# Patient Record
Sex: MALE | Race: WHITE | NOT HISPANIC OR LATINO | Employment: FULL TIME | URBAN - METROPOLITAN AREA
[De-identification: names, ages, dates, MRNs, and addresses within clinical notes are randomized per-mention and may not be internally consistent; named-entity substitution may affect disease eponyms.]

---

## 2024-03-14 ENCOUNTER — HOSPITAL ENCOUNTER (EMERGENCY)
Facility: HOSPITAL | Age: 59
Discharge: HOME | End: 2024-03-14
Attending: EMERGENCY MEDICINE | Admitting: EMERGENCY MEDICINE
Payer: COMMERCIAL

## 2024-03-14 ENCOUNTER — APPOINTMENT (OUTPATIENT)
Dept: GENERAL RADIOLOGY | Facility: HOSPITAL | Age: 59
End: 2024-03-14
Payer: COMMERCIAL

## 2024-03-14 ENCOUNTER — APPOINTMENT (OUTPATIENT)
Dept: MRI IMAGING | Facility: HOSPITAL | Age: 59
End: 2024-03-14
Payer: COMMERCIAL

## 2024-03-14 ENCOUNTER — APPOINTMENT (OUTPATIENT)
Dept: CT IMAGING | Facility: HOSPITAL | Age: 59
End: 2024-03-14
Payer: COMMERCIAL

## 2024-03-14 VITALS
RESPIRATION RATE: 21 BRPM | HEART RATE: 62 BPM | SYSTOLIC BLOOD PRESSURE: 138 MMHG | BODY MASS INDEX: 23.77 KG/M2 | DIASTOLIC BLOOD PRESSURE: 95 MMHG | TEMPERATURE: 99.1 F | OXYGEN SATURATION: 96 % | WEIGHT: 169.75 LBS | HEIGHT: 71 IN

## 2024-03-14 DIAGNOSIS — R20.0 FACIAL NUMBNESS: Primary | ICD-10-CM

## 2024-03-14 DIAGNOSIS — I10 HYPERTENSION NOT AT GOAL: ICD-10-CM

## 2024-03-14 LAB
ALBUMIN SERPL-MCNC: 4.2 G/DL (ref 3.5–5.2)
ALBUMIN/GLOB SERPL: 1.6 G/DL
ALP SERPL-CCNC: 59 U/L (ref 39–117)
ALT SERPL W P-5'-P-CCNC: 10 U/L (ref 1–41)
ANION GAP SERPL CALCULATED.3IONS-SCNC: 12.2 MMOL/L (ref 5–15)
AST SERPL-CCNC: 18 U/L (ref 1–40)
BASOPHILS # BLD AUTO: 0.04 10*3/MM3 (ref 0–0.2)
BASOPHILS NFR BLD AUTO: 0.6 % (ref 0–1.5)
BILIRUB SERPL-MCNC: 0.8 MG/DL (ref 0–1.2)
BUN SERPL-MCNC: 14 MG/DL (ref 8–23)
BUN/CREAT SERPL: 14 (ref 7–25)
CALCIUM SPEC-SCNC: 9.1 MG/DL (ref 8.6–10.5)
CHLORIDE SERPL-SCNC: 104 MMOL/L (ref 98–107)
CO2 SERPL-SCNC: 23.8 MMOL/L (ref 22–29)
CREAT SERPL-MCNC: 1 MG/DL (ref 0.76–1.27)
DEPRECATED RDW RBC AUTO: 42.5 FL (ref 37–54)
EGFRCR SERPLBLD CKD-EPI 2021: 86.2 ML/MIN/1.73
EOSINOPHIL # BLD AUTO: 0.09 10*3/MM3 (ref 0–0.4)
EOSINOPHIL NFR BLD AUTO: 1.3 % (ref 0.3–6.2)
ERYTHROCYTE [DISTWIDTH] IN BLOOD BY AUTOMATED COUNT: 13.2 % (ref 12.3–15.4)
GLOBULIN UR ELPH-MCNC: 2.6 GM/DL
GLUCOSE SERPL-MCNC: 86 MG/DL (ref 65–99)
HCT VFR BLD AUTO: 42.4 % (ref 37.5–51)
HGB BLD-MCNC: 14.3 G/DL (ref 13–17.7)
IMM GRANULOCYTES # BLD AUTO: 0.01 10*3/MM3 (ref 0–0.05)
IMM GRANULOCYTES NFR BLD AUTO: 0.1 % (ref 0–0.5)
LYMPHOCYTES # BLD AUTO: 1.19 10*3/MM3 (ref 0.7–3.1)
LYMPHOCYTES NFR BLD AUTO: 17.4 % (ref 19.6–45.3)
MCH RBC QN AUTO: 29.7 PG (ref 26.6–33)
MCHC RBC AUTO-ENTMCNC: 33.7 G/DL (ref 31.5–35.7)
MCV RBC AUTO: 88 FL (ref 79–97)
MONOCYTES # BLD AUTO: 0.65 10*3/MM3 (ref 0.1–0.9)
MONOCYTES NFR BLD AUTO: 9.5 % (ref 5–12)
NEUTROPHILS NFR BLD AUTO: 4.84 10*3/MM3 (ref 1.7–7)
NEUTROPHILS NFR BLD AUTO: 71.1 % (ref 42.7–76)
NRBC BLD AUTO-RTO: 0 /100 WBC (ref 0–0.2)
NT-PROBNP SERPL-MCNC: 65.1 PG/ML (ref 0–900)
PLATELET # BLD AUTO: 297 10*3/MM3 (ref 140–450)
PMV BLD AUTO: 9.4 FL (ref 6–12)
POTASSIUM SERPL-SCNC: 4 MMOL/L (ref 3.5–5.2)
PROT SERPL-MCNC: 6.8 G/DL (ref 6–8.5)
RBC # BLD AUTO: 4.82 10*6/MM3 (ref 4.14–5.8)
SODIUM SERPL-SCNC: 140 MMOL/L (ref 136–145)
TROPONIN T SERPL HS-MCNC: <6 NG/L
WBC NRBC COR # BLD AUTO: 6.82 10*3/MM3 (ref 3.4–10.8)

## 2024-03-14 PROCEDURE — 99284 EMERGENCY DEPT VISIT MOD MDM: CPT

## 2024-03-14 PROCEDURE — 84484 ASSAY OF TROPONIN QUANT: CPT | Performed by: EMERGENCY MEDICINE

## 2024-03-14 PROCEDURE — 85025 COMPLETE CBC W/AUTO DIFF WBC: CPT | Performed by: EMERGENCY MEDICINE

## 2024-03-14 PROCEDURE — 70551 MRI BRAIN STEM W/O DYE: CPT

## 2024-03-14 PROCEDURE — 83880 ASSAY OF NATRIURETIC PEPTIDE: CPT | Performed by: EMERGENCY MEDICINE

## 2024-03-14 PROCEDURE — 70450 CT HEAD/BRAIN W/O DYE: CPT

## 2024-03-14 PROCEDURE — 93005 ELECTROCARDIOGRAM TRACING: CPT | Performed by: EMERGENCY MEDICINE

## 2024-03-14 PROCEDURE — 71045 X-RAY EXAM CHEST 1 VIEW: CPT

## 2024-03-14 PROCEDURE — 80053 COMPREHEN METABOLIC PANEL: CPT | Performed by: EMERGENCY MEDICINE

## 2024-03-14 RX ORDER — LISINOPRIL 10 MG/1
10 TABLET ORAL DAILY
Qty: 30 TABLET | Refills: 0 | Status: SHIPPED | OUTPATIENT
Start: 2024-03-14

## 2024-03-14 NOTE — ED PROVIDER NOTES
"Time: 1:38 PM EDT  Date of encounter:  3/14/2024  Independent Historian/Clinical History and Information was obtained by:   Patient    History is limited by: N/A    Chief Complaint: Facial numbness      History of Present Illness:  Patient is a 59 y.o. year old male who presents to the emergency department for evaluation of left-sided facial numbness.  Reports this started this morning around 11:00.  States that he had a similar episode last weekend and then it slowly resolved.  He was seen at urgent care at that time.  He also reports he has been having some intermittent right hand numbness.  He does states that he has had issues with his neck and has had issues with his hand being numb occasionally.  States that this morning his facial numbness got more intense and he also got a little nauseous with it and came to the hospital.  Does have a history of hypertension but does not take anything.  No other complaints this time.    HPI    Patient Care Team  Primary Care Provider: Provider, No Known    Past Medical History:     No Known Allergies  History reviewed. No pertinent past medical history.  History reviewed. No pertinent surgical history.  History reviewed. No pertinent family history.    Home Medications:  Prior to Admission medications    Not on File        Social History:          Review of Systems:  Review of Systems     Physical Exam:  /95 (BP Location: Right arm, Patient Position: Sitting)   Pulse 72   Temp 99.1 °F (37.3 °C) (Oral)   Resp 21   Ht 180.3 cm (71\")   Wt 77 kg (169 lb 12.1 oz)   SpO2 97%   BMI 23.68 kg/m²     Physical Exam             Procedures:  Procedures      Medical Decision Making:      Comorbidities that affect care:    Hypertension    External Notes reviewed:    Reviewed urgent care note from 3/9/2024      The following orders were placed and all results were independently analyzed by me:  Orders Placed This Encounter   Procedures    CT Head Without Contrast    XR Chest 1 " View    MRI Brain Without Contrast    Comprehensive Metabolic Panel    Single High Sensitivity Troponin T    BNP    CBC Auto Differential    ECG 12 Lead Electrolyte Imbalance    CBC & Differential       Medications Given in the Emergency Department:  Medications - No data to display     ED Course:    ED Course as of 03/14/24 1640   Thu Mar 14, 2024   1611 EKG interpreted by me  Time: 1533  Heart rate 60  Sinus, normal QRS, normal axis, no acute ischemia [MA]      ED Course User Index  [MA] Lalo Francisco MD       Labs:    Lab Results (last 24 hours)       Procedure Component Value Units Date/Time    CBC & Differential [[id number]]  (Abnormal) Collected: 03/14/24 1429    Specimen: Blood Updated: 03/14/24 1439    Narrative:      The following orders were created for panel order CBC & Differential.  Procedure                               Abnormality         Status                     ---------                               -----------         ------                     CBC Auto Differential[[id number]]        Abnormal            Final result                 Please view results for these tests on the individual orders.    Comprehensive Metabolic Panel [[id number]] Collected: 03/14/24 1429    Specimen: Blood Updated: 03/14/24 1507     Glucose 86 mg/dL      BUN 14 mg/dL      Creatinine 1.00 mg/dL      Sodium 140 mmol/L      Potassium 4.0 mmol/L      Chloride 104 mmol/L      CO2 23.8 mmol/L      Calcium 9.1 mg/dL      Total Protein 6.8 g/dL      Albumin 4.2 g/dL      ALT (SGPT) 10 U/L      AST (SGOT) 18 U/L      Alkaline Phosphatase 59 U/L      Total Bilirubin 0.8 mg/dL      Globulin 2.6 gm/dL      A/G Ratio 1.6 g/dL      BUN/Creatinine Ratio 14.0     Anion Gap 12.2 mmol/L      eGFR 86.2 mL/min/1.73     Narrative:      GFR Normal >60  Chronic Kidney Disease <60  Kidney Failure <15      Single High Sensitivity Troponin T [[id number]]  (Normal) Collected: 03/14/24 1429    Specimen: Blood Updated: 03/14/24 1507     HS  Troponin T <6 ng/L     Narrative:      High Sensitive Troponin T Reference Range:  <14.0 ng/L- Negative Female for AMI  <22.0 ng/L- Negative Male for AMI  >=14 - Abnormal Female indicating possible myocardial injury.  >=22 - Abnormal Male indicating possible myocardial injury.   Clinicians would have to utilize clinical acumen, EKG, Troponin, and serial changes to determine if it is an Acute Myocardial Infarction or myocardial injury due to an underlying chronic condition.         BNP [[id number]]  (Normal) Collected: 03/14/24 1429    Specimen: Blood Updated: 03/14/24 1505     proBNP 65.1 pg/mL     Narrative:      This assay is used as an aid in the diagnosis of individuals suspected of having heart failure. It can be used as an aid in the diagnosis of acute decompensated heart failure (ADHF) in patients presenting with signs and symptoms of ADHF to the emergency department (ED). In addition, NT-proBNP of <300 pg/mL indicates ADHF is not likely.    Age Range Result Interpretation  NT-proBNP Concentration (pg/mL:      <50             Positive            >450                   Gray                 300-450                    Negative             <300    50-75           Positive            >900                  Gray                300-900                  Negative            <300      >75             Positive            >1800                  Gray                300-1800                  Negative            <300    CBC Auto Differential [[id number]]  (Abnormal) Collected: 03/14/24 1429    Specimen: Blood Updated: 03/14/24 1439     WBC 6.82 10*3/mm3      RBC 4.82 10*6/mm3      Hemoglobin 14.3 g/dL      Hematocrit 42.4 %      MCV 88.0 fL      MCH 29.7 pg      MCHC 33.7 g/dL      RDW 13.2 %      RDW-SD 42.5 fl      MPV 9.4 fL      Platelets 297 10*3/mm3      Neutrophil % 71.1 %      Lymphocyte % 17.4 %      Monocyte % 9.5 %      Eosinophil % 1.3 %      Basophil % 0.6 %      Immature Grans % 0.1 %      Neutrophils, Absolute  4.84 10*3/mm3      Lymphocytes, Absolute 1.19 10*3/mm3      Monocytes, Absolute 0.65 10*3/mm3      Eosinophils, Absolute 0.09 10*3/mm3      Basophils, Absolute 0.04 10*3/mm3      Immature Grans, Absolute 0.01 10*3/mm3      nRBC 0.0 /100 WBC              Imaging:    MRI Brain Without Contrast    Result Date: 3/14/2024  PROCEDURE: MRI BRAIN WO CONTRAST  COMPARISON: None  INDICATIONS: left facial numbness    TECHNIQUE: A variety of imaging planes and parameters were utilized for visualization of suspected pathology.  Images were performed without contrast.  FINDINGS:  No diffusion restriction to indicate acute ischemia.  Negative for mass effect or midline shift.  The ventricles and cortical sulci are normally configured.  No findings of intracranial hemorrhage.  Posterior fossa without acute abnormality.  Major T2 weighted intracranial vascular flow voids are patent.  The mastoid air cells are well-aerated.  Negative for sinus fluid level.  Globes intact.  No retro-orbital abnormality.  Pituitary gland normal in size.  No suprasellar mass.  Included portions of the upper cervical spine and cervical cord are without acute abnormality.        No acute intracranial abnormality, specifically negative for acute ischemia.      JAYSON PANTOJA MD       Electronically Signed and Approved By: JAYSON PANTOJA MD on 3/14/2024 at 15:59             XR Chest 1 View    Result Date: 3/14/2024  PROCEDURE: XR CHEST 1 VW  COMPARISON: None  INDICATIONS: left sided face tingling and numbness x 2 hours  FINDINGS:  Heart size and pulmonary vasculature within normal limits.  Lungs clear.  Costophrenic angles sharp       No active cardiopulmonary disease       ADAN SCHWARTZ MD       Electronically Signed and Approved By: ADAN SCHWARTZ MD on 3/14/2024 at 14:29             CT Head Without Contrast    Result Date: 3/14/2024  PROCEDURE: CT HEAD WO CONTRAST  COMPARISON:  None INDICATIONS: numbness on left side of jaw, dizziness and nausea  PROTOCOL:    Standard imaging protocol performed    RADIATION:   DLP: 1017.2mGy*cm   MA and/or KV was adjusted to minimize radiation dose.     TECHNIQUE: After obtaining the patient's consent, CT images were obtained without non-ionic intravenous contrast material.  FINDINGS:  No hemorrhage, edema, or mass effect.  CSF spaces within normal limits.  No fluid in the visualized paranasal sinuses/middle ear cavities       Negative     ADAN SCHWARTZ MD       Electronically Signed and Approved By: ADAN SCHWARTZ MD on 3/14/2024 at 13:24                Differential Diagnosis and Discussion:    Paresthesia: Differential diagnosis includes but is not limited to acute stroke, electrolyte imbalance, anxiety, radiculopathy, autoimmune disorders, and endocrine disorders.    All labs were reviewed and interpreted by me.  All X-rays impressions were independently interpreted by me.  EKG was interpreted by me.  CT scan radiology impression was interpreted by me.    MDM     Amount and/or Complexity of Data Reviewed  Clinical lab tests: reviewed  Tests in the radiology section of CPT®: reviewed  Tests in the medicine section of CPT®: reviewed       Patient is a 59-year-old gentleman who presents with complaints of left facial numbness and as well as some intermittent hand numbness.  States that this was ongoing last week and then resolved and then came back today.  On exam he has no acute findings except some facial numbness on the left lower face.  CT head did not show any acute findings.  I did do an MRI on the patient which was negative for acute stroke.  He is well-appearing at this time with no other acute findings.  Recommend that he follow-up with his primary care doctor.  Appears to be mainly paresthesias.  He does have an elevated blood pressure here as well as at urgent care 1 week prior.  Will place on lisinopril.  Will need to see his primary care doctor.  Will DC.          Patient Care Considerations:          Consultants/Shared  Management Plan:    None    Social Determinants of Health:    Patient is independent, reliable, and has access to care.       Disposition and Care Coordination:    Discharged: The patient is suitable and stable for discharge with no need for consideration of admission.    I have explained the patient´s condition, diagnoses and treatment plan based on the information available to me at this time. I have answered questions and addressed any concerns. The patient has a good  understanding of the patient´s diagnosis, condition, and treatment plan as can be expected at this point. The vital signs have been stable. The patient´s condition is stable and appropriate for discharge from the emergency department.      The patient will pursue further outpatient evaluation with the primary care physician or other designated or consulting physician as outlined in the discharge instructions. They are agreeable to this plan of care and follow-up instructions have been explained in detail. The patient has received these instructions in written format and have expressed an understanding of the discharge instructions. The patient is aware that any significant change in condition or worsening of symptoms should prompt an immediate return to this or the closest emergency department or call to 911.      Final diagnoses:   Facial numbness   Hypertension not at goal        ED Disposition       ED Disposition   Discharge    Condition   Stable    Comment   --               This medical record created using voice recognition software.             Lalo Francisco MD  03/14/24 [id number]

## 2024-03-15 LAB
QT INTERVAL: 374 MS
QTC INTERVAL: 373 MS